# Patient Record
Sex: FEMALE | Race: WHITE | URBAN - METROPOLITAN AREA
[De-identification: names, ages, dates, MRNs, and addresses within clinical notes are randomized per-mention and may not be internally consistent; named-entity substitution may affect disease eponyms.]

---

## 2018-05-11 ENCOUNTER — OFFICE VISIT (OUTPATIENT)
Dept: FAMILY MEDICINE CLINIC | Age: 48
End: 2018-05-11

## 2018-05-11 VITALS
RESPIRATION RATE: 16 BRPM | HEIGHT: 66 IN | DIASTOLIC BLOOD PRESSURE: 80 MMHG | SYSTOLIC BLOOD PRESSURE: 118 MMHG | OXYGEN SATURATION: 98 % | HEART RATE: 82 BPM | TEMPERATURE: 99 F | BODY MASS INDEX: 27.64 KG/M2 | WEIGHT: 172 LBS

## 2018-05-11 DIAGNOSIS — L40.9 SCALP PSORIASIS: ICD-10-CM

## 2018-05-11 DIAGNOSIS — R60.0 LOCALIZED EDEMA: ICD-10-CM

## 2018-05-11 DIAGNOSIS — J40 BRONCHITIS: Primary | ICD-10-CM

## 2018-05-11 DIAGNOSIS — E03.9 ACQUIRED HYPOTHYROIDISM: ICD-10-CM

## 2018-05-11 DIAGNOSIS — G89.4 CHRONIC PAIN SYNDROME: ICD-10-CM

## 2018-05-11 DIAGNOSIS — G43.109 MIGRAINE WITH AURA AND WITHOUT STATUS MIGRAINOSUS, NOT INTRACTABLE: ICD-10-CM

## 2018-05-11 DIAGNOSIS — G44.229 CHRONIC TENSION-TYPE HEADACHE, NOT INTRACTABLE: ICD-10-CM

## 2018-05-11 DIAGNOSIS — M65.332 TRIGGER MIDDLE FINGER OF LEFT HAND: ICD-10-CM

## 2018-05-11 RX ORDER — ZINC GLUCONATE 50 MG
TABLET ORAL 3 TIMES DAILY
COMMUNITY

## 2018-05-11 RX ORDER — MOMETASONE FUROATE 1 MG/G
CREAM TOPICAL DAILY
Qty: 45 G | Refills: 1 | Status: SHIPPED | OUTPATIENT
Start: 2018-05-11 | End: 2018-09-04 | Stop reason: SDUPTHER

## 2018-05-11 RX ORDER — LIDOCAINE 50 MG/G
PATCH TOPICAL EVERY 24 HOURS
COMMUNITY

## 2018-05-11 RX ORDER — BUTALBITAL, ACETAMINOPHEN AND CAFFEINE 50; 325; 40 MG/1; MG/1; MG/1
1 TABLET ORAL
Qty: 15 TAB | Refills: 1 | Status: SHIPPED | OUTPATIENT
Start: 2018-05-11 | End: 2018-09-04 | Stop reason: SDUPTHER

## 2018-05-11 RX ORDER — BENZONATATE 100 MG/1
100 CAPSULE ORAL
COMMUNITY
End: 2018-05-11 | Stop reason: SDUPTHER

## 2018-05-11 RX ORDER — PROMETHAZINE HYDROCHLORIDE 25 MG/1
25 TABLET ORAL
COMMUNITY
End: 2019-05-16 | Stop reason: ALTCHOICE

## 2018-05-11 RX ORDER — DOXYCYCLINE 100 MG/1
100 CAPSULE ORAL 2 TIMES DAILY
Qty: 14 CAP | Refills: 0 | Status: SHIPPED | OUTPATIENT
Start: 2018-05-11 | End: 2018-05-18

## 2018-05-11 RX ORDER — LEVOTHYROXINE AND LIOTHYRONINE 38; 9 UG/1; UG/1
60 TABLET ORAL DAILY
Qty: 90 TAB | Refills: 3 | Status: SHIPPED | OUTPATIENT
Start: 2018-05-11 | End: 2019-04-17 | Stop reason: ALTCHOICE

## 2018-05-11 RX ORDER — POTASSIUM CHLORIDE 20 MEQ/1
TABLET, EXTENDED RELEASE ORAL
Qty: 90 TAB | Refills: 1 | Status: SHIPPED | OUTPATIENT
Start: 2018-05-11 | End: 2019-03-03 | Stop reason: SDUPTHER

## 2018-05-11 RX ORDER — NARATRIPTAN 2.5 MG/1
2.5 TABLET ORAL
Qty: 27 TAB | Refills: 1 | Status: SHIPPED | OUTPATIENT
Start: 2018-05-11 | End: 2018-08-30 | Stop reason: SDUPTHER

## 2018-05-11 RX ORDER — FUROSEMIDE 20 MG/1
20 TABLET ORAL
Qty: 180 TAB | Refills: 1 | Status: SHIPPED | OUTPATIENT
Start: 2018-05-11

## 2018-05-11 RX ORDER — TIZANIDINE HYDROCHLORIDE 4 MG/1
4 CAPSULE, GELATIN COATED ORAL 3 TIMES DAILY
COMMUNITY

## 2018-05-11 RX ORDER — BENZONATATE 100 MG/1
100 CAPSULE ORAL
Qty: 30 CAP | Refills: 0 | Status: SHIPPED | OUTPATIENT
Start: 2018-05-11 | End: 2019-03-20 | Stop reason: SDUPTHER

## 2018-05-11 RX ORDER — LANSOPRAZOLE 30 MG/1
15 CAPSULE, DELAYED RELEASE ORAL
COMMUNITY

## 2018-05-11 RX ORDER — PSEUDOEPHEDRINE HCL 30 MG
TABLET ORAL
COMMUNITY

## 2018-05-11 RX ORDER — MOMETASONE FUROATE 1 MG/ML
SOLUTION TOPICAL
Qty: 60 ML | Refills: 1 | Status: SHIPPED | OUTPATIENT
Start: 2018-05-11 | End: 2018-08-31 | Stop reason: SDUPTHER
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
## 2018-05-11 NOTE — PROGRESS NOTES
Sona Frost is a 52 y.o. female (: 1970) presenting to address:    Chief Complaint   Patient presents with   Sneha Rahman [facility] [place] [facility]    Sinus Pain    Ear Fullness       Vitals:    18 0901   BP: 118/80   Pulse: 82   Resp: 16   Temp: 99 °F (37.2 °C)   TempSrc: Oral   SpO2: 98%   Weight: 172 lb (78 kg)   Height: 5' 6\" (1.676 m)   PainSc:   6   PainLoc: Back   LMP: 05/10/2018       Hearing/Vision:      Visual Acuity Screening    Right eye Left eye Both eyes   Without correction:      With correction: 20/15 20/15 20/15       Learning Assessment:   No flowsheet data found. Depression Screening:     PHQ over the last two weeks 2018   Little interest or pleasure in doing things Not at all   Feeling down, depressed or hopeless Not at all   Total Score PHQ 2 0     Fall Risk Assessment:     Fall Risk Assessment, last 12 mths 2018   Able to walk? Yes   Fall in past 12 months? No     Abuse Screening:     Abuse Screening Questionnaire 2018   Do you ever feel afraid of your partner? N   Are you in a relationship with someone who physically or mentally threatens you? N   Is it safe for you to go home? Y     Coordination of Care Questionaire:   1. Have you been to the ER, urgent care clinic since your last visit? Hospitalized since your last visit? NO    2. Have you seen or consulted any other health care providers outside of the [address] [place] [address] since your last visit? Include any pap smears or colon screening. NO    Advanced Directive:   1. Do you have an Advanced Directive? NO    2. Would you like information on Advanced Directives?  NO

## 2018-05-11 NOTE — MR AVS SNAPSHOT
[address] [address] [facility] [id number] [phone] Patient: Shiraz Rios MRN: [id number] WFP:[id number] Visit Information Date & Time Provider Department Dept. Phone Encounter #  
 5/11/2018  8:45 AM Tara De Jesus, [employer] [phone] [id number] Upcoming Health Maintenance Date Due DTaP/Tdap/Td series (1 - Tdap) 8/1/1991 Influenza Age 5 to Adult 8/1/2018 PAP AKA CERVICAL CYTOLOGY 5/8/2021 Allergies as of 5/11/2018  Review Complete On: 5/11/2018 By: Tara De Jesus MD  
  
 Severity Noted Reaction Type Reactions Bee Sting [Sting, Bee] High 11/30/2016    Anaphylaxis Adhesive  11/30/2016    Itching Rash Codeine  11/30/2016    Itching Corticosteroids (Glucocorticoids)  11/30/2016    Other (comments) Triggers migraines Demerol [Meperidine]  11/30/2016    Itching Morphine  11/30/2016    Itching Neurontin [Gabapentin]  11/30/2016    Other (comments) Shakes high doses only Nsaids (Non-steroidal Anti-inflammatory Drug)  11/30/2016    Other (comments) \"leaky capillaries\" Other Medication  11/30/2016    Other (comments) Oral steroids, trigger migraines Pcn [Penicillins]  11/30/2016    Itching Itchy patches Reglan [Metoclopramide]  11/30/2016    Other (comments)  
 shakes Current Immunizations  Never Reviewed No immunizations on file. Not reviewed this visit You Were Diagnosed With   
  
 Codes Comments Bronchitis    -  Primary ICD-10-CM: A11 ICD-9-CM: 573 Migraine with aura and without status migrainosus, not intractable     ICD-10-CM: G43.109 ICD-9-CM: 346.00 Acquired hypothyroidism     ICD-10-CM: E03.9 ICD-9-CM: 474. 9 Chronic pain syndrome     ICD-10-CM: G89.4 ICD-9-CM: 338.4 Localized edema     ICD-10-CM: R60.0 ICD-9-CM: 782.3  Trigger middle finger of left hand     ICD-10-CM: P02.870 
 ICD-9-CM: 727.03 Scalp psoriasis     ICD-10-CM: L40.9 ICD-9-CM: 696.1 Chronic tension-type headache, not intractable     ICD-10-CM: U72.577 ICD-9-CM: 339.12 Vitals BP Pulse Temp Resp Height(growth percentile) Weight(growth percentile) 118/80 (BP 1 Location: Left arm, BP Patient Position: Sitting) 82 99 °F (37.2 °C) (Oral) 16 5' 6\" (1.676 m) 172 lb (78 kg) LMP SpO2 BMI OB Status Smoking Status 05/10/2018 98% 27.76 kg/m2 Having regular periods Never Smoker Vitals History BMI and BSA Data Body Mass Index Body Surface Area  
 27.76 kg/m 2 1.91 m 2 Preferred Pharmacy Pharmacy Name Phone [facility] [address] [place] [address], [address]ny [address] [phone] Your Updated Medication List  
  
   
This list is accurate as of 5/11/18  9:59 AM.  Always use your most recent med list.  
  
  
  
  
 ALIVE WOMEN'S GUMMY VITAMINS 200 mcg- 37.5 mg Chew Generic drug:  multivit-min-FA-herbal no. [place] Take  by mouth.  
  
 benzonatate 100 mg capsule Commonly known as:  TESSALON Take 1 Cap by mouth three (3) times daily as needed for Cough. biotin 5 mg capsule Commonly known as:  VITAMIN B7 Take  by mouth. butalbital-acetaminophen-caffeine -40 mg per tablet Commonly known as:  Vicci Boyce Take 1 Tab by mouth daily as needed for Pain. Indications: TENSION-TYPE HEADACHE  
  
 CLEOCIN T 1 % lotion Generic drug:  clindamycin Apply  to affected area two (2) times a day. use thin film on affected area  
  
 doxycycline 100 mg capsule Commonly known as:  Mahnaz Lolling Take 1 Cap by mouth two (2) times a day for 7 days. EPIPEN 0.3 mg/0.3 mL injection Generic drug:  EPINEPHrine  
0.3 mg by IntraMUSCular route once as needed. furosemide 20 mg tablet Commonly known as:  LASIX Take 1 Tab by mouth two (2) times daily as needed. glucosamine-chondroitin 500-400 mg Cap Commonly known as:  20 [facility] Take 1 Cap by mouth daily. LIDODERM 5 % Generic drug:  lidocaine  
by TransDERmal route every twenty-four (24) hours. Apply patch to the affected area for 12 hours a day and remove for 12 hours a day. MAGNESIUM CITRATE PO Take 100 mg by mouth two (2) times a day. Magnesium Oxide 500 mg Cap Take  by mouth three (3) times daily. melatonin 5 mg Cap capsule Take 10 mg by mouth nightly. methadone 10 mg tablet Commonly known as:  DOLOPHINE Take  by mouth every four (4) hours as needed for Pain. * mometasone 0.1 % lotion Commonly known as:  Nikki Pare Apply to affected area on scalp daily x 7 days * mometasone 0.1 % topical cream  
Commonly known as:  Nikki Pare Apply  to affected area daily. mupirocin 2 % ointment Commonly known as:  [employer] Apply  to affected area daily. naratriptan 2.5 mg Tab Commonly known as:  Lossie Lama Take 1 Tab by mouth once as needed. NORTREL 1/35 (28) 1-35 mg-mcg Tab Generic drug:  norethindrone-ethinyl estradiol  
  
 potassium chloride 20 mEq tablet Commonly known as:  KLOR-CON M10 One tab daily when taking lasix PREVACID 30 mg capsule Generic drug:  lansoprazole Take 15 mg by mouth Daily (before breakfast). promethazine 25 mg tablet Commonly known as:  PHENERGAN Take 25 mg by mouth every six (6) hours as needed for Nausea. pseudoephedrine 30 mg tablet Commonly known as:  SUDAFED Take  by mouth every four (4) hours as needed for Congestion. thyroid (Pork) 60 mg tablet Commonly known as:  ARMOUR THYROID Take 1 Tab by mouth daily. tretinoin 0.025 % topical cream  
Commonly known as:  RETIN-A Apply  to affected area nightly. VALERIAN ROOT Take 450 mg by mouth two (2) times a day. VITAMIN D3 1,000 unit Cap Generic drug:  cholecalciferol Take 4,000 Units by mouth daily. ZANAFLEX 4 mg capsule Generic drug:  tiZANidine Take 4 mg by mouth three (3) times daily. ZOFRAN 8 mg tablet Generic drug:  ondansetron hcl Take 8 mg by mouth every eight (8) hours as needed for Nausea. * Notice: This list has 2 medication(s) that are the same as other medications prescribed for you. Read the directions carefully, and ask your doctor or other care provider to review them with you. Prescriptions Sent to Pharmacy Refills  
 thyroid, Pork, (ARMOUR THYROID) 60 mg tablet 3 Sig: Take 1 Tab by mouth daily. Class: Normal  
 Pharmacy: [facility] [address] Ph #: [phone] Route: Oral  
 naratriptan (AMERGE) 2.5 mg tab 1 Sig: Take 1 Tab by mouth once as needed. Class: Normal  
 Pharmacy: [place] [address] Ph #: [phone] Route: Oral  
 benzonatate (TESSALON) 100 mg capsule 0 Sig: Take 1 Cap by mouth three (3) times daily as needed for Cough. Class: Normal  
 Pharmacy: [place] [address] Ph #: [phone] Route: Oral  
 mometasone (ELOCON) 0.1 % lotion 1 Sig: Apply to affected area on scalp daily x 7 days Class: Normal  
 Pharmacy: [place] [address] Ph #: [phone]  
 furosemide (LASIX) 20 mg tablet 1 Sig: Take 1 Tab by mouth two (2) times daily as needed. Class: Normal  
 Pharmacy: [place] [address] Ph #: [phone] Route: Oral  
 potassium chloride (K-DUR, KLOR-CON) 20 mEq tablet 1 Sig: One tab daily when taking lasix Class: Normal  
 Pharmacy: [place] [address] Ph #: [phone]  
 mometasone (ELOCON) 0.1 % topical cream 1 Sig: Apply  to affected area daily. Class: Normal  
 Pharmacy: [place] [address] Ph #: [phone]  Route: Topical  
 butalbital-acetaminophen-caffeine (FIORICET, ESGIC) -40 mg per tablet 1 Sig: Take 1 Tab by mouth daily as needed for Pain. Indications: TENSION-TYPE HEADACHE Class: Normal  
 Pharmacy: [facility] [address] [place] [address] #: [phone] Route: Oral  
 doxycycline (MONODOX) 100 mg capsule 0 Sig: Take 1 Cap by mouth two (2) times a day for 7 days. Class: Normal  
 Pharmacy: [facility] [address] [place] [address] Ph #: [phone] Route: Oral  
  
Introducing [facility]! Beatrice David introduces Surround App patient portal. Now you can access parts of your medical record, email your doctor's office, and request medication refills online. 1. In your internet browser, go to [email or web address] 2. Click on the First Time User? Click Here link in the Sign In box. You will see the New Member Sign Up page. 3. Enter your Surround App Access Code exactly as it appears below. You will not need to use this code after youve completed the sign-up process. If you do not sign up before the expiration date, you must request a new code. · Surround App Access Code: [email or web address] Expires: 8/9/2018  9:59 AM 
 
4. Enter the last four digits of your Social Security Number (xxxx) and Date of Birth (mm/dd/yyyy) as indicated and click Submit. You will be taken to the next sign-up page. 5. Create a Aliveshoest ID. This will be your Surround App login ID and cannot be changed, so think of one that is secure and easy to remember. 6. Create a Aliveshoest password. You can change your password at any time. 7. Enter your Password Reset Question and Answer. This can be used at a later time if you forget your password. 8. Enter your e-mail address. You will receive e-mail notification when new information is available in [address] 9. Click Sign Up. You can now view and download portions of your medical record. 10. Click the Download Summary menu link to download a portable copy of your medical information. If you have questions, please visit the Frequently Asked Questions section of the [employer] website. Remember, Cloud4Wi is NOT to be used for urgent needs. For medical emergencies, dial 911. Now available from your iPhone and Android! Please provide this summary of care documentation to your next provider. Your primary care clinician is listed as Garrett Schultz. If you have any questions after today's visit, please call [phone].

## 2018-08-30 DIAGNOSIS — G43.109 MIGRAINE WITH AURA AND WITHOUT STATUS MIGRAINOSUS, NOT INTRACTABLE: ICD-10-CM

## 2018-08-31 NOTE — TELEPHONE ENCOUNTER
Patient pharmacy is requesting a med refill of the following:    Drysol solution quantity 37.5 -historical provider- not found on med lists (added) pending for approval  Zofran ODT 8 mg tab- historical provider  Mometasone Furoate 0/1 % soln apply one application daily as needed #60  Naratriptan HCL 2.5 mg     Last visit: 5/11/18    Last refill: 5/11/18

## 2018-09-04 RX ORDER — ONDANSETRON HYDROCHLORIDE 8 MG/1
8 TABLET, FILM COATED ORAL
Qty: 60 TAB | Refills: 0 | Status: SHIPPED | OUTPATIENT
Start: 2018-09-04 | End: 2018-09-07 | Stop reason: ALTCHOICE

## 2018-09-04 RX ORDER — MOMETASONE FUROATE 1 MG/ML
SOLUTION TOPICAL
Qty: 60 ML | Refills: 1 | Status: SHIPPED | OUTPATIENT
Start: 2018-09-04 | End: 2019-03-12 | Stop reason: SDUPTHER

## 2018-09-04 RX ORDER — NARATRIPTAN 2.5 MG/1
TABLET ORAL
Qty: 27 TAB | Refills: 0 | Status: SHIPPED | OUTPATIENT
Start: 2018-09-04 | End: 2018-10-22 | Stop reason: SDUPTHER

## 2018-09-07 RX ORDER — ONDANSETRON 8 MG/1
8 TABLET, ORALLY DISINTEGRATING ORAL
Qty: 90 TAB | Refills: 0 | Status: SHIPPED | OUTPATIENT
Start: 2018-09-07 | End: 2019-03-11 | Stop reason: SDUPTHER

## 2018-10-22 DIAGNOSIS — G43.109 MIGRAINE WITH AURA AND WITHOUT STATUS MIGRAINOSUS, NOT INTRACTABLE: ICD-10-CM

## 2018-10-23 RX ORDER — NARATRIPTAN 2.5 MG/1
TABLET ORAL
Qty: 27 TAB | Refills: 0 | Status: SHIPPED | OUTPATIENT
Start: 2018-10-23 | End: 2019-03-11 | Stop reason: SDUPTHER

## 2019-03-11 DIAGNOSIS — G43.109 MIGRAINE WITH AURA AND WITHOUT STATUS MIGRAINOSUS, NOT INTRACTABLE: ICD-10-CM

## 2019-03-11 RX ORDER — NARATRIPTAN 2.5 MG/1
TABLET ORAL
Qty: 27 TAB | Refills: 3 | Status: SHIPPED | OUTPATIENT
Start: 2019-03-11

## 2019-03-11 RX ORDER — ONDANSETRON 8 MG/1
TABLET, ORALLY DISINTEGRATING ORAL
Qty: 90 TAB | Refills: 0 | Status: SHIPPED | OUTPATIENT
Start: 2019-03-11 | End: 2019-05-10 | Stop reason: SDUPTHER

## 2019-03-12 NOTE — TELEPHONE ENCOUNTER
[facility] pharmacy is requesting a med refill of Mometasone Furoate 0.1 % soln (med list shows Mometasone 0.1% lotion)    Last filled: 9/4/18 (Mometasone 0.1% lotion)  Last visit: 5/11/18  Next appointment: none

## 2019-03-13 RX ORDER — MOMETASONE FUROATE 1 MG/ML
SOLUTION TOPICAL
Qty: 60 ML | Refills: 1 | Status: SHIPPED | OUTPATIENT
Start: 2019-03-13

## 2019-03-18 ENCOUNTER — OFFICE VISIT (OUTPATIENT)
Dept: FAMILY MEDICINE CLINIC | Age: 49
End: 2019-03-18

## 2019-03-18 ENCOUNTER — TELEPHONE (OUTPATIENT)
Dept: FAMILY MEDICINE CLINIC | Age: 49
End: 2019-03-18

## 2019-03-18 VITALS
HEIGHT: 66 IN | TEMPERATURE: 100 F | DIASTOLIC BLOOD PRESSURE: 87 MMHG | BODY MASS INDEX: 30.28 KG/M2 | OXYGEN SATURATION: 97 % | HEART RATE: 85 BPM | WEIGHT: 188.4 LBS | SYSTOLIC BLOOD PRESSURE: 153 MMHG | RESPIRATION RATE: 20 BRPM

## 2019-03-18 DIAGNOSIS — J01.00 ACUTE NON-RECURRENT MAXILLARY SINUSITIS: Primary | ICD-10-CM

## 2019-03-18 DIAGNOSIS — J04.0 LARYNGITIS: ICD-10-CM

## 2019-03-18 RX ORDER — SULFAMETHOXAZOLE AND TRIMETHOPRIM 800; 160 MG/1; MG/1
1 TABLET ORAL 2 TIMES DAILY
Qty: 14 TAB | Refills: 0 | Status: SHIPPED | OUTPATIENT
Start: 2019-03-18 | End: 2019-03-25

## 2019-03-18 NOTE — PROGRESS NOTES
Jocelyn Roy is a 50 y.o. female (: 1970) for sick visit:     Chief Complaint   Patient presents with    Laryngitis    Fever       Vitals:    19 1304   BP: 153/87   Pulse: 85   Resp: 20   Temp: 100 °F (37.8 °C)   TempSrc: Oral   SpO2: 97%   Weight: 188 lb 6.4 oz (85.5 kg)   Height: 5' 6\" (1.676 m)   PainSc:   3   PainLoc: Throat   LMP: 2019         Coordination of Care Questionaire:   1. Have you been to the ER, urgent care clinic since your last visit? Hospitalized since your last visit? no    2. Have you seen or consulted any other health care providers outside of the [address] [place] [address] since your last visit? Include any pap smears or colon screening.   pain mgmt     Health Maintenance      Health Maintenance Due   Topic Date Due    Influenza Age 5 to Adult  2018
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
## 2019-03-18 NOTE — PATIENT INSTRUCTIONS
Stop use of potassium while on bactrim restart when finished. Also attempted to call patient to inform can not prescribe diflucan d/t methadone interaction. Sinusitis: Care Instructions  Your Care Instructions    Sinusitis is an infection of the lining of the sinus cavities in your head. Sinusitis often follows a cold. It causes pain and pressure in your head and face. In most cases, sinusitis gets better on its own in 1 to 2 weeks. But some mild symptoms may last for several weeks. Sometimes antibiotics are needed. Follow-up care is a key part of your treatment and safety. Be sure to make and go to all appointments, and call your doctor if you are having problems. It's also a good idea to know your test results and keep a list of the medicines you take. How can you care for yourself at home? · Take an over-the-counter pain medicine, such as acetaminophen (Tylenol), ibuprofen (Advil, Motrin), or naproxen (Aleve). Read and follow all instructions on the label. · If the doctor prescribed antibiotics, take them as directed. Do not stop taking them just because you feel better. You need to take the full course of antibiotics. · Be careful when taking over-the-counter cold or flu medicines and Tylenol at the same time. Many of these medicines have acetaminophen, which is Tylenol. Read the labels to make sure that you are not taking more than the recommended dose. Too much acetaminophen (Tylenol) can be harmful. · Breathe warm, moist air from a steamy shower, a hot bath, or a sink filled with hot water. Avoid cold, dry air. Using a humidifier in your home may help. Follow the directions for cleaning the machine. · Use saline (saltwater) nasal washes to help keep your nasal passages open and wash out mucus and bacteria. You can buy saline nose drops at a grocery store or drugstore. Or you can make your own at home by adding 1 teaspoon of salt and 1 teaspoon of baking soda to 2 cups of distilled water.  If you make your own, fill a bulb syringe with the solution, insert the tip into your nostril, and squeeze gently. Verlee Leader your nose. · Put a hot, wet towel or a warm gel pack on your face 3 or 4 times a day for 5 to 10 minutes each time. · Try a decongestant nasal spray like oxymetazoline (Afrin). Do not use it for more than 3 days in a row. Using it for more than 3 days can make your congestion worse. When should you call for help? Call your doctor now or seek immediate medical care if:    · You have new or worse swelling or redness in your face or around your eyes.     · You have a new or higher fever.    Watch closely for changes in your health, and be sure to contact your doctor if:    · You have new or worse facial pain.     · The mucus from your nose becomes thicker (like pus) or has new blood in it.     · You are not getting better as expected. Where can you learn more? Go to http://mikala-sal.info/. Enter S677 in the search box to learn more about \"Sinusitis: Care Instructions. \"  Current as of: March 27, 2018  Content Version: 11.9  © 5515-9160 [employer]. Care instructions adapted under license by [employer] (which disclaims liability or warranty for this information). If you have questions about a medical condition or this instruction, always ask your healthcare professional. [place] [address] any warranty or liability for your use of this information.

## 2019-03-18 NOTE — TELEPHONE ENCOUNTER
Called pt. Pt was seen today for a sick visit with NP Carlo Son. Pt dropped off an order for an EKG ordered by Dr Mcleod , [facility]. Dx V58.69, Z79.891 chronic opiod use. Pt wanted to know if she could just get an order and have this done at a nurse visit. Pt will need f/u with pcp. Last ov 05/2018. Pt can take order to [facility] but declines due to cost.   Pt will call back to schedule.

## 2019-03-20 DIAGNOSIS — J40 BRONCHITIS: ICD-10-CM

## 2019-03-20 RX ORDER — BENZONATATE 100 MG/1
100 CAPSULE ORAL
Qty: 30 CAP | Refills: 0 | Status: SHIPPED | OUTPATIENT
Start: 2019-03-20

## 2019-04-17 ENCOUNTER — OFFICE VISIT (OUTPATIENT)
Dept: FAMILY MEDICINE CLINIC | Age: 49
End: 2019-04-17

## 2019-04-17 VITALS
WEIGHT: 187 LBS | HEIGHT: 66 IN | DIASTOLIC BLOOD PRESSURE: 72 MMHG | TEMPERATURE: 98 F | RESPIRATION RATE: 20 BRPM | SYSTOLIC BLOOD PRESSURE: 110 MMHG | HEART RATE: 75 BPM | OXYGEN SATURATION: 99 % | BODY MASS INDEX: 30.05 KG/M2

## 2019-04-17 DIAGNOSIS — G43.109 MIGRAINE WITH AURA AND WITHOUT STATUS MIGRAINOSUS, NOT INTRACTABLE: ICD-10-CM

## 2019-04-17 DIAGNOSIS — Z51.81 MEDICATION MONITORING ENCOUNTER: ICD-10-CM

## 2019-04-17 DIAGNOSIS — L70.0 ACNE VULGARIS: ICD-10-CM

## 2019-04-17 DIAGNOSIS — E03.9 ACQUIRED HYPOTHYROIDISM: ICD-10-CM

## 2019-04-17 DIAGNOSIS — G43.109 MIGRAINE WITH AURA AND WITHOUT STATUS MIGRAINOSUS, NOT INTRACTABLE: Primary | ICD-10-CM

## 2019-04-17 RX ORDER — CLINDAMYCIN PHOSPHATE 11.9 MG/ML
SOLUTION TOPICAL 2 TIMES DAILY
COMMUNITY
End: 2019-04-17 | Stop reason: SDUPTHER

## 2019-04-17 RX ORDER — CLINDAMYCIN PHOSPHATE 11.9 MG/ML
SOLUTION TOPICAL
Qty: 60 ML | Refills: 3 | Status: SHIPPED | OUTPATIENT
Start: 2019-04-17

## 2019-04-17 RX ORDER — CLINDAMYCIN PHOSPHATE 10 MG/G
GEL TOPICAL 2 TIMES DAILY
COMMUNITY
End: 2019-04-17 | Stop reason: SDUPTHER

## 2019-04-17 RX ORDER — CLINDAMYCIN PHOSPHATE 10 MG/G
GEL TOPICAL 2 TIMES DAILY
Qty: 60 G | Refills: 3 | Status: SHIPPED | OUTPATIENT
Start: 2019-04-17

## 2019-04-17 NOTE — PROGRESS NOTES
Assessment/Plan: 1. Migraine with aura and without status migrainosus, not intractable 
-I have little to offer in terms of prophylaxis. Most meds interact with methadone or tizandine. Had side effects with other prophylaxis meds (elavil, effexor). Could do verapamil if tizanidine d/c'd. 
 
2. Medication monitoring encounter 
-no QT prolongation.  ekg copy provided. - AMB POC EKG ROUTINE W/ 12 LEADS, INTER & REP 3. Hypothyroid - change from armour to nature-throid. Ck labs in 2 mos. 4. Acne - clinda The plan was discussed with the patient. The patient verbalized understanding and is in agreement with the plan. All medication potential side effects were discussed with the patient. Health Maintenance:  
Health Maintenance Topic Date Due  Influenza Age 5 to Adult  08/01/2019  
 DTaP/Tdap/Td series (2 - Td) 09/01/2020  PAP AKA CERVICAL CYTOLOGY  05/08/2021  Pneumococcal 0-64 years  Aged Out Marge Lorenz is a 50 y.o. female and presents with Migraine Subjective: 
Pt is on chronic methadone. Pain management requesting EKG to monitor for QT prolongation. Pt has chronic migraines. Previously on verapamil and requesting to go back on that b/c she's having more frequent migraines. Verapamil does interact with tizanidine. She feels stress contributes (currently caring for her dad who is in hospice). She is getting one episode every 2-3 weeks, each one last 2-3 days. Also gets rod-menstrual migraines. She's on birth control for this. Intol of topamax b/c it caused kidney stones. Had also been on lexapro for this (but can't do this with methadone). Had wt gain on inderal.  She is already on magnesium and melatonin, two herbal supplements that are suspected to help migraine prevention. Previously on feverfew, but stopped it due to interactions with migraine prevention meds. Acne - requesting refill of clinda. Uses on body and face. Hypothyroid- on armour. However, it's expensive. Wants to switch to nature thyroid. ROS: 
Constitutional: No recent weight change. No weakness/fatigue. No f/c. HENT: + HA, no dizziness. No hearing loss/tinnitus. No nasal congestion/discharge. Eyes: No change in vision, double/blurred vision or eye pain/redness. Cardiovascular: No CP/palpitations. No BUSTOS/orthopnea/PND. Respiratory: No cough/sputum, dyspnea, wheezing. Gastointestinal: No dysphagia, reflux. No n/v. No constipation/diarrhea. No melena/rectal bleeding. The problem list was updated as a part of today's visit. Patient Active Problem List  
Diagnosis Code  Chronic pain syndrome G89.4  Migraine with aura and without status migrainosus, not intractable G43. 80  Acquired hypothyroidism E03.9  Localized edema R60.0  Trigger middle finger of left hand M65.332  Scalp psoriasis L40.9 The PSH, FH were reviewed. SH: Social History Tobacco Use  Smoking status: Never Smoker  Smokeless tobacco: Never Used Substance Use Topics  Alcohol use: No  
 Drug use: Not on file Medications/Allergies: 
Current Outpatient Medications on File Prior to Visit Medication Sig Dispense Refill  norethindrone-ethin estradiol (NORTREL 1/35, 21,) 1-35 mg-mcg (21) tab Take  by mouth.  benzonatate (TESSALON) 100 mg capsule Take 1 Cap by mouth three (3) times daily as needed for Cough.  30 Cap 0  
 mometasone (ELOCON) 0.1 % lotion Apply to affected area on scalp daily x 7 days 60 mL 1  
 naratriptan (AMERGE) 2.5 mg tab TAKE ONE TABLET BY MOUTH DAILY AS NEEDED 27 Tab 3  
 aluminum chloride (DRYSOL) 20 % external solution APPLY ONE THIN LAYER TOPICALLY DAILY 60 mL 3  
 ondansetron (ZOFRAN ODT) 8 mg disintegrating tablet DISSOLVE ONE TABLET BY MOUTH EVERY 8 HOURS AS NEEDED FOR NAUSEA 90 Tab 0  
 potassium chloride (K-DUR, KLOR-CON) 20 mEq tablet TAKE ONE TABLET BY MOUTH DAILY WHEN TAKING LASIX 90 Tab 3  
  butalbital-acetaminophen-caffeine (FIORICET, ESGIC) -40 mg per tablet TAKE ONE TABLET BY MOUTH DAILY AS NEEDED FOR PAIN 15 Tab 3  
 lidocaine (LIDODERM) 5 % by TransDERmal route every twenty-four (24) hours. Apply patch to the affected area for 12 hours a day and remove for 12 hours a day.  promethazine (PHENERGAN) 25 mg tablet Take 25 mg by mouth every six (6) hours as needed for Nausea.  tiZANidine (ZANAFLEX) 4 mg capsule Take 4 mg by mouth three (3) times daily.  Magnesium Oxide 500 mg cap Take  by mouth three (3) times daily.  lansoprazole (PREVACID) 30 mg capsule Take 15 mg by mouth Daily (before breakfast).  pseudoephedrine (SUDAFED) 30 mg tablet Take  by mouth every four (4) hours as needed for Congestion.  thyroid, Pork, (ARMOUR THYROID) 60 mg tablet Take 1 Tab by mouth daily. 90 Tab 3  furosemide (LASIX) 20 mg tablet Take 1 Tab by mouth two (2) times daily as needed. 180 Tab 1  EPINEPHrine (EPIPEN) 0.3 mg/0.3 mL injection 0.3 mg by IntraMUSCular route once as needed.  glucosamine-chondroitin (ARTHX) 500-400 mg cap Take 1 Cap by mouth daily.  VALERIAN ROOT Take 450 mg by mouth two (2) times a day.  melatonin 5 mg cap capsule Take 10 mg by mouth nightly.  methadone (DOLOPHINE) 10 mg tablet Take  by mouth every four (4) hours as needed for Pain.  cholecalciferol (VITAMIN D3) 1,000 unit cap Take 4,000 Units by mouth daily.  multivit-min-FA-herbal no. 245 (ALIVE WOMEN'S GUMMY VITAMINS) 200 mcg- 37.5 mg chew Take  by mouth. No current facility-administered medications on file prior to visit. Allergies Allergen Reactions  Bee Sting [Sting, Bee] Anaphylaxis  Adhesive Itching Rash  Codeine Itching  Corticosteroids (Glucocorticoids) Other (comments) Triggers migraines  Demerol [Meperidine] Itching  Morphine Itching  Neurontin [Gabapentin] Other (comments) Shakes high doses only  Nsaids (Non-Steroidal Anti-Inflammatory Drug) Other (comments) \"leaky capillaries\"  Other Medication Other (comments) Oral steroids, trigger migraines  Pcn [Penicillins] Itching Itchy patches  Reglan [Metoclopramide] Other (comments)  
  shakes Objective: 
Visit Vitals /72 (BP 1 Location: Right arm, BP Patient Position: Sitting) Pulse 75 Temp 98 °F (36.7 °C) (Oral) Resp 20 Ht 5' 6\" (1.676 m) Wt 187 lb (84.8 kg) LMP 04/04/2019 SpO2 99% BMI 30.18 kg/m² Constitutional: Well developed, nourished, no distress, alert, obese habitus Eyes: Conjunctiva normal. PERRL. Eyelids normal.  
CV: S1, S2.  RRR. No murmurs/rubs. Pulm: No abnormalities on inspection. Clear to auscultation bilaterally. No wheezing/rhonchi. Normal effort. GI: Soft, nontender, nondistended. Normal active bowel sounds.
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
## 2019-05-13 RX ORDER — ONDANSETRON 8 MG/1
TABLET, ORALLY DISINTEGRATING ORAL
Qty: 90 TAB | Refills: 0 | Status: SHIPPED | OUTPATIENT
Start: 2019-05-13

## 2019-05-13 NOTE — TELEPHONE ENCOUNTER
Requested Prescriptions     Pending Prescriptions Disp Refills    norethindrone-ethin estradiol (NORTREL 1/35, 21,) 1-35 mg-mcg (21) tab       Sig: Take  by mouth. Patient calling to request refill on:      Remaining pills:  Last appt:  Next appt:  No future appointments. Pharmacy:    [address]   Patient aware of 72 hour time frame.

## 2019-05-15 ENCOUNTER — TELEPHONE (OUTPATIENT)
Dept: FAMILY MEDICINE CLINIC | Age: 49
End: 2019-05-15

## 2019-05-15 NOTE — TELEPHONE ENCOUNTER
Pt needs a nurse to reach out to her about a script that she needs to review before it is filled. Pt states that it was declined even though Dr. Timmy Savage had verbally confirmed that she would fill it and monitor it even though she did not fill it Pt states that it is a [place] control\" pill but it is written for menstrual headaches and now the pharmacy and our office has denied it. Please reach out to pt tat you soonest convenience.  [phone]

## 2019-05-16 DIAGNOSIS — G43.109 MIGRAINE WITH AURA AND WITHOUT STATUS MIGRAINOSUS, NOT INTRACTABLE: Primary | ICD-10-CM

## 2019-05-16 NOTE — TELEPHONE ENCOUNTER
Although we discussed that she is taking birth control to help migraines, I do not recall stating I would prescribe this. Additionally, it's generally recommended for women over age 28 with migraines to avoid hormone pills due to increased risk stroke.

## 2019-05-16 NOTE — TELEPHONE ENCOUNTER
Again. I can't prescribe this medication because I feel the risks outweigh any benefits. We could certainly send her to our neurologist (who also accepts sliding scale) for a second opinion.

## 2019-05-23 NOTE — TELEPHONE ENCOUNTER
Call placed, no answer, left message requesting a call back. 3rd attempt.    If patient returns call please ask if she want neuro referral.

## 2019-06-12 ENCOUNTER — TELEPHONE (OUTPATIENT)
Dept: FAMILY MEDICINE CLINIC | Age: 49
End: 2019-06-12

## 2019-06-12 NOTE — TELEPHONE ENCOUNTER
Pt called back in regards to a telephone encounter from 5/15/19 for her migraines. Pt states she never received a call from the nurse. Pt is requesting a call back from a nurse, she states that she knows there is a risk for the medication however she believes that the risk do not out weigh her daily life w/ the medication. She states that she cannot functionin her life because of her migraines being so bad with her off the medication.      Please advise and return her call at the earliest convenience

## 2019-06-14 NOTE — TELEPHONE ENCOUNTER
Spoke to pt. She still believes she was told that the pcp would prescribe her ocp. She stated that it's not fair to her that we would say we would fill her med and then \"change our mind\" after she left the visit. This is not the case according to our records and in speaking to the physician. She denies that we left her the messages on her voicemail that we documented. She sounded very shaken up and demanded that we refill the ocp stating that her other drs have done it for her knowing her health history and the risks. Advised pt we are not able to fill it due to the dr's evaluation of her risk factors. Pt was very upset. She was offered contact information for [facility] for them to re-eval if she would be a good candidate for ocp and [facility] neuroscience for eval for her migraines. They also see pts on a sliding scale. She did not seem interested but agreed I could mychart her their information. Pt stated she had a bp of 156/98 today and not having the meds raises her bp too and she'll start her period next week and probably be in the ED because of this. Restated the risks to her regarding the meds requested and the risk of stroke. Pt asked me to mychart her and ended the call. Information given to pcp.

## 2019-08-14 LAB — MAMMOGRAPHY, EXTERNAL: NORMAL
